# Patient Record
Sex: FEMALE | Race: WHITE | NOT HISPANIC OR LATINO | URBAN - METROPOLITAN AREA
[De-identification: names, ages, dates, MRNs, and addresses within clinical notes are randomized per-mention and may not be internally consistent; named-entity substitution may affect disease eponyms.]

---

## 2022-09-26 RX ORDER — NITROFURANTOIN MACROCRYSTAL 50 MG
1 CAPSULE ORAL
Qty: 0 | Refills: 0 | DISCHARGE
Start: 2022-09-26 | End: 2022-09-30

## 2022-10-05 ENCOUNTER — INPATIENT (INPATIENT)
Facility: HOSPITAL | Age: 42
LOS: 0 days | Discharge: HOME | DRG: 866 | End: 2022-10-06
Attending: HOSPITALIST | Admitting: STUDENT IN AN ORGANIZED HEALTH CARE EDUCATION/TRAINING PROGRAM
Payer: COMMERCIAL

## 2022-10-05 VITALS — WEIGHT: 175.05 LBS

## 2022-10-05 DIAGNOSIS — R50.9 FEVER, UNSPECIFIED: ICD-10-CM

## 2022-10-05 LAB
ADD ON TEST-SPECIMEN IN LAB: SIGNIFICANT CHANGE UP
ADD ON TEST-SPECIMEN IN LAB: SIGNIFICANT CHANGE UP
ALBUMIN SERPL ELPH-MCNC: 3.3 G/DL — SIGNIFICANT CHANGE UP (ref 3.3–5)
ALP SERPL-CCNC: 64 U/L — SIGNIFICANT CHANGE UP (ref 40–120)
ALT FLD-CCNC: 138 U/L — HIGH (ref 12–78)
ANION GAP SERPL CALC-SCNC: 4 MMOL/L — LOW (ref 5–17)
APPEARANCE UR: CLEAR — SIGNIFICANT CHANGE UP
APTT BLD: 29.7 SEC — SIGNIFICANT CHANGE UP (ref 27.5–35.5)
AST SERPL-CCNC: 102 U/L — HIGH (ref 15–37)
BASOPHILS # BLD AUTO: 0.03 K/UL — SIGNIFICANT CHANGE UP (ref 0–0.2)
BASOPHILS NFR BLD AUTO: 0.4 % — SIGNIFICANT CHANGE UP (ref 0–2)
BILIRUB SERPL-MCNC: 0.3 MG/DL — SIGNIFICANT CHANGE UP (ref 0.2–1.2)
BILIRUB UR-MCNC: NEGATIVE — SIGNIFICANT CHANGE UP
BUN SERPL-MCNC: 16 MG/DL — SIGNIFICANT CHANGE UP (ref 7–23)
CALCIUM SERPL-MCNC: 8.8 MG/DL — SIGNIFICANT CHANGE UP (ref 8.5–10.1)
CHLORIDE SERPL-SCNC: 107 MMOL/L — SIGNIFICANT CHANGE UP (ref 96–108)
CO2 SERPL-SCNC: 27 MMOL/L — SIGNIFICANT CHANGE UP (ref 22–31)
COLOR SPEC: YELLOW — SIGNIFICANT CHANGE UP
CREAT SERPL-MCNC: 0.8 MG/DL — SIGNIFICANT CHANGE UP (ref 0.5–1.3)
DIFF PNL FLD: NEGATIVE — SIGNIFICANT CHANGE UP
EGFR: 94 ML/MIN/1.73M2 — SIGNIFICANT CHANGE UP
EOSINOPHIL # BLD AUTO: 0.04 K/UL — SIGNIFICANT CHANGE UP (ref 0–0.5)
EOSINOPHIL NFR BLD AUTO: 0.6 % — SIGNIFICANT CHANGE UP (ref 0–6)
GLUCOSE SERPL-MCNC: 85 MG/DL — SIGNIFICANT CHANGE UP (ref 70–99)
GLUCOSE UR QL: NEGATIVE — SIGNIFICANT CHANGE UP
HCT VFR BLD CALC: 33.3 % — LOW (ref 34.5–45)
HGB BLD-MCNC: 11.1 G/DL — LOW (ref 11.5–15.5)
IMM GRANULOCYTES NFR BLD AUTO: 0.3 % — SIGNIFICANT CHANGE UP (ref 0–0.9)
INR BLD: 1.07 RATIO — SIGNIFICANT CHANGE UP (ref 0.88–1.16)
KETONES UR-MCNC: NEGATIVE — SIGNIFICANT CHANGE UP
LACTATE SERPL-SCNC: 0.9 MMOL/L — SIGNIFICANT CHANGE UP (ref 0.7–2)
LEUKOCYTE ESTERASE UR-ACNC: NEGATIVE — SIGNIFICANT CHANGE UP
LYMPHOCYTES # BLD AUTO: 2.95 K/UL — SIGNIFICANT CHANGE UP (ref 1–3.3)
LYMPHOCYTES # BLD AUTO: 43.5 % — SIGNIFICANT CHANGE UP (ref 13–44)
MCHC RBC-ENTMCNC: 29.8 PG — SIGNIFICANT CHANGE UP (ref 27–34)
MCHC RBC-ENTMCNC: 33.3 GM/DL — SIGNIFICANT CHANGE UP (ref 32–36)
MCV RBC AUTO: 89.3 FL — SIGNIFICANT CHANGE UP (ref 80–100)
MONOCYTES # BLD AUTO: 0.48 K/UL — SIGNIFICANT CHANGE UP (ref 0–0.9)
MONOCYTES NFR BLD AUTO: 7.1 % — SIGNIFICANT CHANGE UP (ref 2–14)
NEUTROPHILS # BLD AUTO: 3.26 K/UL — SIGNIFICANT CHANGE UP (ref 1.8–7.4)
NEUTROPHILS NFR BLD AUTO: 48.1 % — SIGNIFICANT CHANGE UP (ref 43–77)
NITRITE UR-MCNC: NEGATIVE — SIGNIFICANT CHANGE UP
PH UR: 6 — SIGNIFICANT CHANGE UP (ref 5–8)
PLATELET # BLD AUTO: 412 K/UL — HIGH (ref 150–400)
POTASSIUM SERPL-MCNC: 3.9 MMOL/L — SIGNIFICANT CHANGE UP (ref 3.5–5.3)
POTASSIUM SERPL-SCNC: 3.9 MMOL/L — SIGNIFICANT CHANGE UP (ref 3.5–5.3)
PROT SERPL-MCNC: 8.1 GM/DL — SIGNIFICANT CHANGE UP (ref 6–8.3)
PROT UR-MCNC: NEGATIVE — SIGNIFICANT CHANGE UP
PROTHROM AB SERPL-ACNC: 12.4 SEC — SIGNIFICANT CHANGE UP (ref 10.5–13.4)
RAPID RVP RESULT: SIGNIFICANT CHANGE UP
RBC # BLD: 3.73 M/UL — LOW (ref 3.8–5.2)
RBC # FLD: 11.8 % — SIGNIFICANT CHANGE UP (ref 10.3–14.5)
SARS-COV-2 RNA SPEC QL NAA+PROBE: SIGNIFICANT CHANGE UP
SODIUM SERPL-SCNC: 138 MMOL/L — SIGNIFICANT CHANGE UP (ref 135–145)
SP GR SPEC: 1.01 — SIGNIFICANT CHANGE UP (ref 1.01–1.02)
UROBILINOGEN FLD QL: NEGATIVE — SIGNIFICANT CHANGE UP
WBC # BLD: 6.78 K/UL — SIGNIFICANT CHANGE UP (ref 3.8–10.5)
WBC # FLD AUTO: 6.78 K/UL — SIGNIFICANT CHANGE UP (ref 3.8–10.5)

## 2022-10-05 PROCEDURE — 99285 EMERGENCY DEPT VISIT HI MDM: CPT

## 2022-10-05 PROCEDURE — 76705 ECHO EXAM OF ABDOMEN: CPT

## 2022-10-05 PROCEDURE — 93010 ELECTROCARDIOGRAM REPORT: CPT

## 2022-10-05 PROCEDURE — 84702 CHORIONIC GONADOTROPIN TEST: CPT

## 2022-10-05 PROCEDURE — 74177 CT ABD & PELVIS W/CONTRAST: CPT | Mod: 26

## 2022-10-05 PROCEDURE — 70450 CT HEAD/BRAIN W/O DYE: CPT | Mod: 26,MA

## 2022-10-05 PROCEDURE — 74177 CT ABD & PELVIS W/CONTRAST: CPT | Mod: MA

## 2022-10-05 PROCEDURE — 85025 COMPLETE CBC W/AUTO DIFF WBC: CPT

## 2022-10-05 PROCEDURE — 71045 X-RAY EXAM CHEST 1 VIEW: CPT | Mod: 26

## 2022-10-05 PROCEDURE — 80053 COMPREHEN METABOLIC PANEL: CPT

## 2022-10-05 PROCEDURE — 36415 COLL VENOUS BLD VENIPUNCTURE: CPT

## 2022-10-05 RX ORDER — IBUPROFEN 200 MG
2 TABLET ORAL
Qty: 0 | Refills: 0 | DISCHARGE

## 2022-10-05 RX ORDER — SODIUM CHLORIDE 9 MG/ML
2500 INJECTION INTRAMUSCULAR; INTRAVENOUS; SUBCUTANEOUS ONCE
Refills: 0 | Status: COMPLETED | OUTPATIENT
Start: 2022-10-05 | End: 2022-10-05

## 2022-10-05 RX ORDER — PREGABALIN 225 MG/1
1 CAPSULE ORAL
Qty: 0 | Refills: 0 | DISCHARGE

## 2022-10-05 RX ORDER — CHOLECALCIFEROL (VITAMIN D3) 125 MCG
1 CAPSULE ORAL
Qty: 0 | Refills: 0 | DISCHARGE

## 2022-10-05 RX ORDER — IBUPROFEN 200 MG
200 TABLET ORAL ONCE
Refills: 0 | Status: COMPLETED | OUTPATIENT
Start: 2022-10-05 | End: 2022-10-05

## 2022-10-05 RX ORDER — CEFTRIAXONE 500 MG/1
2000 INJECTION, POWDER, FOR SOLUTION INTRAMUSCULAR; INTRAVENOUS ONCE
Refills: 0 | Status: COMPLETED | OUTPATIENT
Start: 2022-10-05 | End: 2022-10-05

## 2022-10-05 RX ADMIN — CEFTRIAXONE 100 MILLIGRAM(S): 500 INJECTION, POWDER, FOR SOLUTION INTRAMUSCULAR; INTRAVENOUS at 17:42

## 2022-10-05 RX ADMIN — SODIUM CHLORIDE 2500 MILLILITER(S): 9 INJECTION INTRAMUSCULAR; INTRAVENOUS; SUBCUTANEOUS at 15:43

## 2022-10-05 RX ADMIN — Medication 200 MILLIGRAM(S): at 22:43

## 2022-10-05 RX ADMIN — SODIUM CHLORIDE 2500 MILLILITER(S): 9 INJECTION INTRAMUSCULAR; INTRAVENOUS; SUBCUTANEOUS at 16:43

## 2022-10-05 NOTE — ED ADULT NURSE NOTE - OBJECTIVE STATEMENT
41 y/o female awake alert and oriented x4 presents to fever since 9/17. Pt had intermittent fever since 9/17, followed up with PCP, tested negative for flu and covid. Pt has been taking advil for fever tmax 103 and headache with relief. Pt has been following with Dr. Ghosh (infectious disease) and was told to come in to r/o meningitis. Denies neck stiffness, vomiting, nausea. Pt was at [place] in Aug to drop off her daughter in college. Denies chest pain, sob, abd pain. hx [facility] barr.

## 2022-10-05 NOTE — PHARMACOTHERAPY INTERVENTION NOTE - COMMENTS
Medication reconciliation completed.  Reviewed Medication list and confirmed med allergies with patient; confirmed with Dr. First Medlily.

## 2022-10-05 NOTE — ED PROVIDER NOTE - OBJECTIVE STATEMENT
43 yo female presents to the ED sent in by Dr. Rona Ghosh ([phone]) for spinal tap and r/o septic meningitis. Pt has endorsed fever from 103.3, headache, body aches and chills x3 weeks. Pt was on Macrobid 9/26 for UTI for 5 days.  Pt was seen by her PCP and sent to Dr. Ghosh and had an infectious disease workup which has been negative. Pt had a complaint of a severe headache yesterday to Dr. Ghosh. Pt woke up last night with a headache and was unable to move in the morning with a fever of 100.

## 2022-10-05 NOTE — ED ADULT NURSE REASSESSMENT NOTE - NS ED NURSE REASSESS COMMENT FT1
Tylenol given as per md order. Vitals stable. Updated with plan of care. Will cont to monitor for safety and comfort.

## 2022-10-05 NOTE — ED PROVIDER NOTE - CLINICAL SUMMARY MEDICAL DECISION MAKING FREE TEXT BOX
Pt sent by Dr. Ghosh infectious disease with no obvious source of fever but pt is afebrile in the ED. Discussed with hospitalist, will admit for FUO for lumbar puncture in the morning. Pt does not have a stiff neck

## 2022-10-05 NOTE — ED PROVIDER NOTE - MUSCULOSKELETAL, MLM
Spine appears normal, range of motion is not limited, no muscle or joint tenderness, extremities no edema

## 2022-10-05 NOTE — ED ADULT TRIAGE NOTE - CHIEF COMPLAINT QUOTE
Pt sent in by Dr. Ghosh (infectious disease) for spinal tap and r/o meningitis. Pt has endorsed fever and headache x2 weeks. Was on Marcobid 9/19 for UTI. Denies urinary symptoms.

## 2022-10-06 VITALS
SYSTOLIC BLOOD PRESSURE: 110 MMHG | DIASTOLIC BLOOD PRESSURE: 74 MMHG | OXYGEN SATURATION: 100 % | TEMPERATURE: 99 F | RESPIRATION RATE: 16 BRPM | HEART RATE: 84 BPM

## 2022-10-06 LAB
ADD ON TEST-SPECIMEN IN LAB: SIGNIFICANT CHANGE UP
ALBUMIN SERPL ELPH-MCNC: 3.1 G/DL — LOW (ref 3.3–5)
ALP SERPL-CCNC: 62 U/L — SIGNIFICANT CHANGE UP (ref 40–120)
ALT FLD-CCNC: 123 U/L — HIGH (ref 12–78)
ANION GAP SERPL CALC-SCNC: 3 MMOL/L — LOW (ref 5–17)
AST SERPL-CCNC: 77 U/L — HIGH (ref 15–37)
BASOPHILS # BLD AUTO: 0.01 K/UL — SIGNIFICANT CHANGE UP (ref 0–0.2)
BASOPHILS NFR BLD AUTO: 0.2 % — SIGNIFICANT CHANGE UP (ref 0–2)
BILIRUB SERPL-MCNC: 0.4 MG/DL — SIGNIFICANT CHANGE UP (ref 0.2–1.2)
BUN SERPL-MCNC: 8 MG/DL — SIGNIFICANT CHANGE UP (ref 7–23)
CALCIUM SERPL-MCNC: 8.7 MG/DL — SIGNIFICANT CHANGE UP (ref 8.5–10.1)
CHLORIDE SERPL-SCNC: 107 MMOL/L — SIGNIFICANT CHANGE UP (ref 96–108)
CO2 SERPL-SCNC: 30 MMOL/L — SIGNIFICANT CHANGE UP (ref 22–31)
CREAT SERPL-MCNC: 0.69 MG/DL — SIGNIFICANT CHANGE UP (ref 0.5–1.3)
CULTURE RESULTS: SIGNIFICANT CHANGE UP
EGFR: 110 ML/MIN/1.73M2 — SIGNIFICANT CHANGE UP
EOSINOPHIL # BLD AUTO: 0.02 K/UL — SIGNIFICANT CHANGE UP (ref 0–0.5)
EOSINOPHIL NFR BLD AUTO: 0.3 % — SIGNIFICANT CHANGE UP (ref 0–6)
GLUCOSE SERPL-MCNC: 80 MG/DL — SIGNIFICANT CHANGE UP (ref 70–99)
HCT VFR BLD CALC: 33.6 % — LOW (ref 34.5–45)
HGB BLD-MCNC: 11.3 G/DL — LOW (ref 11.5–15.5)
IMM GRANULOCYTES NFR BLD AUTO: 0.3 % — SIGNIFICANT CHANGE UP (ref 0–0.9)
LYMPHOCYTES # BLD AUTO: 1.95 K/UL — SIGNIFICANT CHANGE UP (ref 1–3.3)
LYMPHOCYTES # BLD AUTO: 29.9 % — SIGNIFICANT CHANGE UP (ref 13–44)
MCHC RBC-ENTMCNC: 30.1 PG — SIGNIFICANT CHANGE UP (ref 27–34)
MCHC RBC-ENTMCNC: 33.6 GM/DL — SIGNIFICANT CHANGE UP (ref 32–36)
MCV RBC AUTO: 89.4 FL — SIGNIFICANT CHANGE UP (ref 80–100)
MONOCYTES # BLD AUTO: 0.55 K/UL — SIGNIFICANT CHANGE UP (ref 0–0.9)
MONOCYTES NFR BLD AUTO: 8.4 % — SIGNIFICANT CHANGE UP (ref 2–14)
NEUTROPHILS # BLD AUTO: 3.97 K/UL — SIGNIFICANT CHANGE UP (ref 1.8–7.4)
NEUTROPHILS NFR BLD AUTO: 60.9 % — SIGNIFICANT CHANGE UP (ref 43–77)
PLATELET # BLD AUTO: 373 K/UL — SIGNIFICANT CHANGE UP (ref 150–400)
POTASSIUM SERPL-MCNC: 4.1 MMOL/L — SIGNIFICANT CHANGE UP (ref 3.5–5.3)
POTASSIUM SERPL-SCNC: 4.1 MMOL/L — SIGNIFICANT CHANGE UP (ref 3.5–5.3)
PROT SERPL-MCNC: 7.7 GM/DL — SIGNIFICANT CHANGE UP (ref 6–8.3)
RBC # BLD: 3.76 M/UL — LOW (ref 3.8–5.2)
RBC # FLD: 11.9 % — SIGNIFICANT CHANGE UP (ref 10.3–14.5)
SODIUM SERPL-SCNC: 140 MMOL/L — SIGNIFICANT CHANGE UP (ref 135–145)
SPECIMEN SOURCE: SIGNIFICANT CHANGE UP
WBC # BLD: 6.52 K/UL — SIGNIFICANT CHANGE UP (ref 3.8–10.5)
WBC # FLD AUTO: 6.52 K/UL — SIGNIFICANT CHANGE UP (ref 3.8–10.5)

## 2022-10-06 PROCEDURE — 99222 1ST HOSP IP/OBS MODERATE 55: CPT

## 2022-10-06 PROCEDURE — 76705 ECHO EXAM OF ABDOMEN: CPT | Mod: 26

## 2022-10-06 PROCEDURE — 12345: CPT | Mod: NC

## 2022-10-06 RX ORDER — CEFTRIAXONE 500 MG/1
2000 INJECTION, POWDER, FOR SOLUTION INTRAMUSCULAR; INTRAVENOUS EVERY 24 HOURS
Refills: 0 | Status: DISCONTINUED | OUTPATIENT
Start: 2022-10-06 | End: 2022-10-06

## 2022-10-06 RX ORDER — ZINC SULFATE TAB 220 MG (50 MG ZINC EQUIVALENT) 220 (50 ZN) MG
1 TAB ORAL
Qty: 0 | Refills: 0 | DISCHARGE

## 2022-10-06 RX ORDER — VANCOMYCIN HCL 1 G
1250 VIAL (EA) INTRAVENOUS EVERY 12 HOURS
Refills: 0 | Status: DISCONTINUED | OUTPATIENT
Start: 2022-10-06 | End: 2022-10-06

## 2022-10-06 RX ORDER — LANOLIN ALCOHOL/MO/W.PET/CERES
1 CREAM (GRAM) TOPICAL
Qty: 0 | Refills: 0 | DISCHARGE

## 2022-10-06 RX ORDER — LANOLIN ALCOHOL/MO/W.PET/CERES
3 CREAM (GRAM) TOPICAL AT BEDTIME
Refills: 0 | Status: DISCONTINUED | OUTPATIENT
Start: 2022-10-06 | End: 2022-10-06

## 2022-10-06 RX ORDER — VANCOMYCIN HCL 1 G
VIAL (EA) INTRAVENOUS
Refills: 0 | Status: DISCONTINUED | OUTPATIENT
Start: 2022-10-06 | End: 2022-10-06

## 2022-10-06 RX ORDER — VANCOMYCIN HCL 1 G
1250 VIAL (EA) INTRAVENOUS ONCE
Refills: 0 | Status: COMPLETED | OUTPATIENT
Start: 2022-10-06 | End: 2022-10-06

## 2022-10-06 RX ORDER — ONDANSETRON 8 MG/1
4 TABLET, FILM COATED ORAL EVERY 8 HOURS
Refills: 0 | Status: DISCONTINUED | OUTPATIENT
Start: 2022-10-06 | End: 2022-10-06

## 2022-10-06 RX ORDER — ACETAMINOPHEN 500 MG
650 TABLET ORAL EVERY 6 HOURS
Refills: 0 | Status: DISCONTINUED | OUTPATIENT
Start: 2022-10-06 | End: 2022-10-06

## 2022-10-06 RX ADMIN — Medication 166.67 MILLIGRAM(S): at 01:02

## 2022-10-06 RX ADMIN — Medication 650 MILLIGRAM(S): at 08:07

## 2022-10-06 NOTE — ED ADULT NURSE REASSESSMENT NOTE - NS ED NURSE REASSESS COMMENT FT1
Pt resting in stretcher w/ call bell within reach. Pt aware of POC, awaiting lumbar puncture. Pt breathing even and unlabored. Pt skin warm, dry and intact.

## 2022-10-06 NOTE — PROGRESS NOTE ADULT - SUBJECTIVE AND OBJECTIVE BOX
Pt has been seen and examined with FP resident, resident supervised agree with a/p       Patient is a 42y old  Female who presents with a chief complaint of Fevers, Chills, Headache (06 Oct 2022 00:05)      HPI:  41 y/o F who was referred to  by Dr. Rona Ghosh for further evaluation and management c/o subjective fevers (TMax 103.3'F), chills, body aches and ongoing headaches.     PHYSICAL EXAM:  Vital Signs Last 24 Hrs  T(C): 37.6 (06 Oct 2022 15:45), Max: 37.6 (06 Oct 2022 15:45)  T(F): 99.7 (06 Oct 2022 15:45), Max: 99.7 (06 Oct 2022 15:45)  HR: 81 (06 Oct 2022 15:45) (62 - 85)  BP: 113/78 (06 Oct 2022 15:45) (105/61 - 123/83)  BP(mean): 90 (06 Oct 2022 15:45) (75 - 90)  RR: 18 (06 Oct 2022 15:45) (17 - 18)  SpO2: 100% (06 Oct 2022 15:45) (98% - 100%)    Parameters below as of 06 Oct 2022 15:45  Patient On (Oxygen Delivery Method): room air      -rs-aeeb, cta  -cvs-s1s2 normal   -p/a-soft,bs+      A/P    #Active monon  - d/c today with further management as an outpt, time spent 45 minutes

## 2022-10-06 NOTE — DISCHARGE NOTE PROVIDER - NSDCCPCAREPLAN_GEN_ALL_CORE_FT
PRINCIPAL DISCHARGE DIAGNOSIS  Diagnosis: Infectious mononucleosis  Assessment and Plan of Treatment: You can here with fever, body aches, and other flu-like sxs. You had a normal white blood cell count and lactate. Your hemoglobin was slightly low, with mildly elevated liver function tests, but your urine was negative, and so was your CXR. CT abdomen only showed lymphadenopathy and Abdominal US showed only fatty liver. You were given some fluids and antibiotics, which were stopped when you tested postive for mono. Infectious disease saw you and gave you further recommendations. Today you are doing better and will be discharged for outpatient follow-up with your PCP  - Please get plenty of rest, stay hydrated and eat a healthy diet  - Please avoid vigorous exercise/contact sports and stay at home as you are still contagious  - You may feel tired with some residual sxs for at least another month. Thank you!

## 2022-10-06 NOTE — H&P ADULT - ASSESSMENT
43 y/o F who was referred to  by Dr. Rona Ghosh for further evaluation and management c/o subjective fevers (TMax 103.3'F), chills, body aches and ongoing headaches. Of note the patient's symptoms began approximately 3 weeks (9/17) prior to presentation. The patient states that she was diagnosed on 9/26 with a UTI and treated with Macrobid for 5 days. The patient was referred by her PCP to Dr. Ghosh (Infectious Disease) where an infectious disease workup has been negative since.   Labs => Hgb/Hct 11.1/33.3, D-dimer 411, AST//138, UA (-). CT Head => No acute intracranial bleeding. CT ABD/Pelvis => Nonspecific mild retroperitoneal lymphadenopathy. No other acute findings in the abdomen or pelvis. CXR => Negative chest. In the ED the patient received Ceftriaxone 2g IVPB x 1, Ibuprofen 200mg PO x 1, and NS x 2.5L.    #Fever of Unknown Origin  #Intractable Headaches  ~admit to Medicine  ~f/u PAN C+S  ~f/u w/ ID consultation in the am  ~f/u w/ IR consultation (plan is for possible LP in the am)  ~patient given Ceftriaxone 2g IVPB x 1, and Vancomycin 1.25g IVPB x 1  ~cont. pain management  ~cont. droplet isolation  ~cont. pain management prn  ~f/u ESR/CRP  ~f/u w/ Neurology in the am    #Vte ppx  ~IMPROVE VTE Risk Score is 0  [  ] Previous VTE                                                  3  [  ] Thrombophilia                                               2  [  ] Lower limb paralysis                                      2        (unable to hold up >15 seconds)    [  ] Current Cancer                                              2         (within 6 months)  [  ] Immobilization > 24 hrs                                1  [  ] ICU/CCU stay > 24 hours                              1  [  ] Age > 60                                                      1  ~encourage ambulation       43 y/o F who was referred to  by Dr. Rona Ghosh for further evaluation and management c/o subjective fevers (TMax 103.3'F), chills, body aches and ongoing headaches. Of note the patient's symptoms began approximately 3 weeks (9/17) prior to presentation. The patient states that she was diagnosed on 9/26 with a UTI and treated with Macrobid for 5 days. The patient was referred by her PCP to Dr. Ghosh (Infectious Disease) where an infectious disease workup has been negative since.   Labs => Hgb/Hct 11.1/33.3, D-dimer 411, AST//138, UA (-). CT Head => No acute intracranial bleeding. CT ABD/Pelvis => Nonspecific mild retroperitoneal lymphadenopathy. No other acute findings in the abdomen or pelvis. CXR => Negative chest. In the ED the patient received Ceftriaxone 2g IVPB x 1, Ibuprofen 200mg PO x 1, and NS x 2.5L.    #Fever of Unknown Origin  #Intractable Headaches  ~admit to Medicine  ~f/u PAN C+S  ~f/u w/ ID consultation in the am  ~f/u w/ IR consultation (plan is for possible LP in the am)  ~patient given Ceftriaxone 2g IVPB x 1, and Vancomycin 1.25g IVPB x 1  ~cont. pain management  ~cont. droplet isolation  ~cont. pain management prn  ~f/u ESR/CRP  ~f/u w/ Neurology in the am    #Elevated Liver Enzymes  ~CT Abdomen/Pelvis reviewed  ~f/u abdominal US in the am  ~trend labs     #Vte ppx  ~IMPROVE VTE Risk Score is 0  [  ] Previous VTE                                                  3  [  ] Thrombophilia                                               2  [  ] Lower limb paralysis                                      2        (unable to hold up >15 seconds)    [  ] Current Cancer                                              2         (within 6 months)  [  ] Immobilization > 24 hrs                                1  [  ] ICU/CCU stay > 24 hours                              1  [  ] Age > 60                                                      1  ~encourage ambulation

## 2022-10-06 NOTE — DISCHARGE NOTE PROVIDER - NSDCCAREPROVSEEN_GEN_ALL_CORE_FT
Epifanio White, Savage HATCH  Patkahtie, Terrell Hines, Jossie Bridges, Wilber Ghosh, Rona Jung, Eagle Rodriguez, Patrick Caraballo, Hermann Cordon, Juvencio

## 2022-10-06 NOTE — DISCHARGE NOTE PROVIDER - NSDCMRMEDTOKEN_GEN_ALL_CORE_FT
cyanocobalamin: 1  injectable once  ***pt states she was not feeling well and received a b12 shot ~2 weeks ago***  Macrobid 100 mg oral capsule: 1 cap(s) orally 2 times a day for 5 days  ***course complete***  Motrin  mg oral tablet: 2 tab(s) orally every 6 hours, As Needed - for for headache  Vitamin D3 25 mcg (1000 intl units) oral tablet: 1 tab(s) orally once a day

## 2022-10-06 NOTE — DISCHARGE NOTE PROVIDER - CARE PROVIDER_API CALL
Rona Ghosh)  Internal Medicine  [address] [place] [address], [facility] [address]  [place] [address]  Phone: [phone]  Fax: [phone]  Established Patient  Follow Up Time:

## 2022-10-06 NOTE — DISCHARGE NOTE NURSING/CASE MANAGEMENT/SOCIAL WORK - PATIENT PORTAL LINK FT
You can access the FollowMyHealth Patient Portal offered by [facility] by registering at the following website: [email or web address]. By joining [facility]’s FollowMyHealth portal, you will also be able to view your health information using other applications (apps) compatible with our system.

## 2022-10-06 NOTE — ED ADULT NURSE REASSESSMENT NOTE - NS ED NURSE REASSESS COMMENT FT1
Pt care assumed from previous RN. Pt reports improved symptoms. Pt reports minor headache 3/10, acetaminophen  650mg given. Pt resting in stretcher w/ call bell within reach. Dietary menu given.

## 2022-10-06 NOTE — CONSULT NOTE ADULT - ASSESSMENT
41 y/o Female with past medical history of "mono" as a teenager, admitted on 10/5 for evaluation of fevers that began on 9/17, then became associated with headaches; was treated on 9/26 with macrobid for UTI. Still had intermittent fever and fatigue and her PCP in [place] referred her to Infectious Disease doctor at [facility]. She has had a number of lab tests done; notes travel to [place] in August to bring her daughter to college and to [place] for her sick brother; she denies recent dental work, bug or tick bites. 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 No one at home is sick. No photophobia, no stiff neck and when she had temp to 99.8 her Infectious Disease doctor told her to come to ED for lumbar puncture. She did have many outpatient labs, done at [facility] for which I was able to review, upon review, it appears that the EBV labs are all positive consistent with acute EBV infection.     1. Patient admitted with fever, extensive workup, however the labwork is significant for acute EBV infection  - have stopped all antibiotics  - discontinue lumbar puncture order  - stop isolation  - patient stable for discharge on no antibiotics  - no contact sports, may have fatigue and malaise for up to six weeks  - no further iv locks or fluids  - discussed with rn at bedside and patient hospitalist to discharge patient home with PCP follow up

## 2022-10-06 NOTE — DISCHARGE NOTE PROVIDER - HOSPITAL COURSE
41 y/o w/no significant PMH presenting for fevers. Pt reports 3 week hx of fever, no alleviating/aggravating sxs, associated with ha, shaking chills, myalgias, with possible changes in urination as well. She was originally saw outpatient doctor who started her on macrobid for UTI, but when sxs did not improve, pt presented to . In the ED, pt was found to be afebrile, hemodynamically stable but reporting pain. Labs were notable for WBC and lactate WNL but Hgb/Hct was at 11.1/33.3, D-dimer was at 411, AST/ALT was at 102/138. CXR was clear and UA was WNL. CT head had no acute changes,  and CT a/p showed only nonspecific mild retroperitoneal lymphadenopathy. Pt was given fluids, abx, and tylenol and stayed for further evaluation. Abdominal US showed only hepatic steatosis. Infectious disease saw pt, and noted that labwork came back + for EBV. Today pt feels somewhat better with VSS. She will be discharged for further f/u with her PCP.    Subjective: No significant events since the ED. Pt still endorses slight headache but is eager to leave and is amenable to plan for discharge. Pt denies syncope, cp, sob, palpitations, changes in urination. ROS as stated above.    Vital Signs Last 24 Hrs  T(C): 37.6 (06 Oct 2022 15:45), Max: 37.6 (06 Oct 2022 15:45)  T(F): 99.7 (06 Oct 2022 15:45), Max: 99.7 (06 Oct 2022 15:45)  HR: 81 (06 Oct 2022 15:45) (62 - 85)  BP: 113/78 (06 Oct 2022 15:45) (105/61 - 123/83)  BP(mean): 90 (06 Oct 2022 15:45) (75 - 90)  RR: 18 (06 Oct 2022 15:45) (17 - 18)  SpO2: 100% (06 Oct 2022 15:45) (98% - 100%)    Parameters below as of 06 Oct 2022 15:45  Patient On (Oxygen Delivery Method): room air    Vital Signs Last 24 Hrs  T(C): 37.6 (06 Oct 2022 15:45), Max: 37.6 (06 Oct 2022 15:45)  T(F): 99.7 (06 Oct 2022 15:45), Max: 99.7 (06 Oct 2022 15:45)  HR: 81 (06 Oct 2022 15:45) (62 - 85)  BP: 113/78 (06 Oct 2022 15:45) (105/61 - 123/83)  BP(mean): 90 (06 Oct 2022 15:45) (75 - 90)  RR: 18 (06 Oct 2022 15:45) (17 - 18)  SpO2: 100% (06 Oct 2022 15:45) (98% - 100%)    Parameters below as of 06 Oct 2022 15:45  Patient On (Oxygen Delivery Method): room air    PHYSICAL EXAM:  GENERAL: NAD, lying in bed comfortably  HEAD:  Atraumatic, Normocephalic  EYES: EOMI, PERRLA, conjunctiva and sclera clear  ENT: Moist mucous membranes  NECK: Supple, No JVD  CHEST/LUNG: Clear to auscultation bilaterally; No respiratory  HEART: Regular rate and rhythm; No murmurs, rubs, or gallops  ABDOMEN: Bowel sounds present; Soft, Nontender, Nondistended. No hepatomegally  EXTREMITIES:  No clubbing, cyanosis, or edema  NERVOUS SYSTEM:  Alert & Oriented X3, speech clear. No new deficits   MSK: Normal muscle tone, no atrophy, no rigidity, no contractions  SKIN: No new rashes or lesions    EKG/RADIOLOGY  < from: US Abdomen Upper Quadrant Right (10.06.22 @ 04:46) >  IMPRESSION:  Hepatic steatosis.  No cholelithiasis or biliary ductal dilatation.    < from: CT Abdomen and Pelvis w/ IV Cont (10.05.22 @ 17:58) >  IMPRESSION:  Nonspecific mild retroperitoneal lymphadenopathy.    < from: Xray Chest 1 View- PORTABLE-Urgent (10.05.22 @ 16:48) >  IMPRESSION: Negative chest.    < from: CT Head No Cont (10.05.22 @ 16:20) >    FINDINGS:  There is no acute intracranial mass-effect, hemorrhage, midline shift, or   abnormal extra-axial fluid collection. Gray-white differentiation is   maintained.  Ventricles, sulci, and cisterns are normal in size for the patient's age   without hydrocephalus. Basal cisterns are patent.  Visualized paranasal sinuses and mastoid air cells areclear. Calvarium   is intact.    IMPRESSION:  No acute intracranial bleeding.

## 2022-10-06 NOTE — CONSULT NOTE ADULT - SUBJECTIVE AND OBJECTIVE BOX
Patient is a 42y old  Female who presents with a chief complaint of Fevers, Chills, Headache (06 Oct 2022 00:05)    HPI:  41 y/o Female with past medical history of "mono" as a teenager, admitted on 10/5 for evaluation of fevers that began on , then became associated with headaches; was treated on  with macrobid for UTI. Still had intermittent fever and fatigue and her PCP in [place] referred her to Infectious Disease doctor at [facility]. She has had a number of lab tests done; notes travel to [place] in August to bring her daughter to college and to [place] for her sick brother; she denies recent dental work, bug or tick bites. 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 No one at home is sick. No photophobia, no stiff neck and when she had temp to 99.8 her Infectious Disease doctor told her to come to ED for lumbar puncture. She did have many outpatient labs, done at [facility] for which I was able to review, upon review, it appears that the EBV labs are all positive consistent with acute EBV infection.       PMH: as above  PSH: as above  Meds: per reconciliation sheet, noted below  MEDICATIONS  (STANDING):    MEDICATIONS  (PRN):  acetaminophen     Tablet .. 650 milliGRAM(s) Oral every 6 hours PRN Temp greater or equal to 38C (100.4F), Mild Pain (1 - 3)  aluminum hydroxide/magnesium hydroxide/simethicone Suspension 30 milliLiter(s) Oral every 4 hours PRN Dyspepsia  melatonin 3 milliGRAM(s) Oral at bedtime PRN Insomnia  ondansetron Injectable 4 milliGRAM(s) IV Push every 8 hours PRN Nausea and/or Vomiting    Allergies    No Known Allergies    Intolerances      Social: no smoking, no alcohol, no illegal drugs; no recent travel, no exposure to TB  FAMILY HISTORY:  No pertinent family history in first degree relatives       no history of premature cardiovascular disease in first degree relatives  ROS: the patient denies fever, no chills,  no dizziness, no sore throat, no blurry vision, no CP, no palpitations, no SOB, no cough, no abdominal pain, no diarrhea, no N/V, no dysuria, no leg pain, no claudication, no rash, no joint aches, no rectal pain or bleeding, no night sweats  All other systems reviewed and are negative    Vital Signs Last 24 Hrs  T(C): 37.6 (06 Oct 2022 15:45), Max: 37.6 (06 Oct 2022 15:45)  T(F): 99.7 (06 Oct 2022 15:45), Max: 99.7 (06 Oct 2022 15:45)  HR: 81 (06 Oct 2022 15:45) (62 - 85)  BP: 113/78 (06 Oct 2022 15:45) (105/61 - 123/83)  BP(mean): 90 (06 Oct 2022 15:45) (75 - 90)  RR: 18 (06 Oct 2022 15:45) (17 - 18)  SpO2: 100% (06 Oct 2022 15:45) (98% - 100%)    Parameters below as of 06 Oct 2022 15:45  Patient On (Oxygen Delivery Method): room air      Daily     Daily     PE:    Constitutional: frail looking  HEENT: NC/AT, EOMI, PERRLA, conjunctivae clear; ears and nose atraumatic; pharynx clear  Neck: supple; thyroid not palpable  Back: no tenderness  Respiratory: respiratory effort normal; clear to auscultation  Cardiovascular: S1S2 regular, no murmurs  Abdomen: soft, not tender, not distended, positive BS; no liver or spleen organomegaly  Genitourinary: no suprapubic tenderness  Musculoskeletal: no muscle tenderness, no joint swelling or tenderness  Neurological/ Psychiatric: AxOx3, judgement and insight normal;  moving all extremities  Skin: no rashes; no palpable lesions    Labs: all available labs reviewed                        11.3   6.52  )-----------( 373      ( 06 Oct 2022 11:28 )             33.6     10-    140  |  107  |  8   ----------------------------<  80  4.1   |  30  |  0.69    Ca    8.7      06 Oct 2022 11:28    TPro  7.7  /  Alb  3.1<L>  /  TBili  0.4  /  DBili  x   /  AST  77<H>  /  ALT  123<H>  /  AlkPhos  62  10-06     LIVER FUNCTIONS - ( 06 Oct 2022 11:28 )  Alb: 3.1 g/dL / Pro: 7.7 gm/dL / ALK PHOS: 62 U/L / ALT: 123 U/L / AST: 77 U/L / GGT: x           Urinalysis Basic - ( 05 Oct 2022 15:37 )    Color: Yellow / Appearance: Clear / S.010 / pH: x  Gluc: x / Ketone: Negative  / Bili: Negative / Urobili: Negative   Blood: x / Protein: Negative / Nitrite: Negative   Leuk Esterase: Negative / RBC: x / WBC x   Sq Epi: x / Non Sq Epi: x / Bacteria: x      [facility] outpatient labs all positive for acute EBV infection including IgM        < from: CT Abdomen and Pelvis w/ IV Cont (10.05.22 @ 17:58) >    ACC: [id number] EXAM:  CT ABDOMEN AND PELVIS IC                          PROCEDURE DATE:  10/05/2022          INTERPRETATION:  CLINICAL INFORMATION: Fever of unknown origin. High LFTs.    COMPARISON: None.    CONTRAST/COMPLICATIONS:  IV Contrast: Omnipaque 350  90 cc administered   10 cc discarded  Oral Contrast: NONE  Complications: None reported at time of study completion    PROCEDURE:  CT of the Abdomen and Pelvis was performed.  Sagittal and coronal reformats were performed.    FINDINGS:  LOWER CHEST: Within normal limits.    LIVER: Within normal limits.  BILE DUCTS: Normal caliber.  GALLBLADDER: Within normal limits.  SPLEEN: Within normal limits.  PANCREAS: Within normal limits.  ADRENALS: Within normal limits.  KIDNEYS/URETERS: Withinnormal limits.    BLADDER: Within normal limits.  REPRODUCTIVE ORGANS: Uterus and adnexa within normal limits. Corpus   luteum noted in the left ovary.    BOWEL: No bowel obstruction. Appendix is normal.  PERITONEUM: Small amount of pelvic free fluid, likely physiologic.  VESSELS: Within normal limits.  RETROPERITONEUM/LYMPH NODES: Mild para-aortic retroperitoneal   lymphadenopathy, within a reference left periaortic node measuring 11 mm   short axis (series 2, image 42)..  ABDOMINAL WALL: Withinnormal limits.  BONES: Within normal limits.    IMPRESSION:  Nonspecific mild retroperitoneal lymphadenopathy.    No other acute findings in the abdomen or pelvis.    < end of copied text >    < from: US Abdomen Upper Quadrant Right (10.06.22 @ 04:46) >  IMPRESSION:  Hepatic steatosis.  No cholelithiasis or biliary ductal dilatation.    < end of copied text >      Radiology: all available radiological tests reviewed    Advanced directives addressed: full resuscitation

## 2022-10-10 LAB
CULTURE RESULTS: SIGNIFICANT CHANGE UP
CULTURE RESULTS: SIGNIFICANT CHANGE UP
R RICKETTSI AB SER-ACNC: NEGATIVE — SIGNIFICANT CHANGE UP
R RICKETTSI IGM SER-ACNC: 0.53 INDEX — SIGNIFICANT CHANGE UP (ref 0–0.89)
RICK SF IGG TITR SER IF: NEGATIVE — SIGNIFICANT CHANGE UP
RICK SF IGM TITR SER IF: 0.53 INDEX — SIGNIFICANT CHANGE UP (ref 0–0.89)
SPECIMEN SOURCE: SIGNIFICANT CHANGE UP
SPECIMEN SOURCE: SIGNIFICANT CHANGE UP

## 2022-10-11 DIAGNOSIS — B27.00 GAMMAHERPESVIRAL MONONUCLEOSIS WITHOUT COMPLICATION: ICD-10-CM

## 2022-10-11 DIAGNOSIS — Z86.19 PERSONAL HISTORY OF OTHER INFECTIOUS AND PARASITIC DISEASES: ICD-10-CM

## 2022-10-11 DIAGNOSIS — K76.0 FATTY (CHANGE OF) LIVER, NOT ELSEWHERE CLASSIFIED: ICD-10-CM

## 2022-10-11 DIAGNOSIS — Z53.09 PROCEDURE AND TREATMENT NOT CARRIED OUT BECAUSE OF OTHER CONTRAINDICATION: ICD-10-CM

## 2022-10-11 DIAGNOSIS — R74.8 ABNORMAL LEVELS OF OTHER SERUM ENZYMES: ICD-10-CM

## 2024-11-22 NOTE — H&P ADULT - NSHPPHYSICALEXAM_GEN_ALL_CORE
Thank you for coming in to see us at [facility] It was nice to meet you, and I hope you feel better soon. Please feel free to return to the ER at any time should your symptoms get worse, or if you have different emergent concerns.    Our goal in the emergency department is to always give you outstanding care and exceptional service. You may receive a survey by mail or e-mail in the next week regarding your experience in our ED. We would greatly appreciate your completing and returning the survey. Your feedback provides us with a way to recognize our staff who give very good care and it helps us learn how to improve when your experience was below our aspiration of excellence.      It is important to remember that some problems or medical conditions are difficult to diagnose and may not be found or addressed during your Emergency Department visit.  These conditions often start with non-specific symptoms and can only be diagnosed on follow up visits with your primary care physician or specialist when the symptoms continue or change. Please remember that all medical conditions can change, and we cannot predict how you will be feeling tomorrow or the next day.    Return to the ER with any questions/concerns, new/concerning symptoms, worsening, failure to improve or inability to obtain follow-up.       Be sure to follow up with your primary care doctor and review all labs/imaging/tests that were performed during your ER visit with them. It is very common for us to identify non-emergent incidental findings which must be followed up with your primary care physician.  Some labs/imaging/tests may be outside of the normal range, and require non-emergent follow-up and/or further investigation/treatment/procedures/testing to help diagnose/exclude/prevent complications or other potentially serious medical conditions. Some abnormalities may not have been discussed or addressed during your ER visit. Some lab  results may not return during your ER visit but can be accessible by downloading the free [facility] or by visiting [email or web address] . It is important for you to review all labs/imaging/tests which are outside of the normal range with your physician.    An ER visit does not replace a primary care visit, and many screening tests or follow-up tests cannot be ordered by an ER doctor or performed by the ER. Some tests may even require pre-approval.    If you do not have a primary care doctor, you may contact the one listed on your discharge paperwork or you may also call the [facility] Appointment Desk at [phone] , or [employer] at  [phone] to schedule an appointment, or establish care with a primary care doctor or even a specialist and to obtain information about local resources. It is important to your health that you have a primary care doctor.    Please take all medications as directed. We have done our best to select a medication for you that will treat your condition however, all medications may potentially have side-effects and it is impossible to predict which medications may give you side-effects or what those side-effects (if any) those medications may give you.  If you feel that you are having a negative effect or side-effect of any medication you should stop taking those medications immediately and seek medical attention. If you feel that you are having a life-threatening reaction call 911.        Do not drive, swim, climb to height, take a bath, operate heavy machinery, drink alcohol or take potentially sedating medications, sign any legal documents or make any important decisions for 24 hours if you have received any pain medications, sedatives or mood altering drugs during your ER visit or within 24 hours of taking them if they have been prescribed to you.     You can find additional resources for Dentists, hearing aids, durable medical equipment, low cost pharmacies and  other resources at [email or web address]     Vital Signs Last 24 Hrs  T(C): 37.2 (05 Oct 2022 22:44), Max: 37.3 (05 Oct 2022 15:12)  T(F): 98.9 (05 Oct 2022 22:44), Max: 99.1 (05 Oct 2022 15:12)  HR: 83 (05 Oct 2022 22:44) (81 - 84)  BP: 123/83 (05 Oct 2022 22:44) (107/79 - 123/83)  BP(mean): 88 (05 Oct 2022 15:12) (88 - 88)  RR: 18 (05 Oct 2022 22:44) (18 - 18)  SpO2: 99% (05 Oct 2022 22:44) (99% - 100%)    Parameters below as of 05 Oct 2022 22:44  Patient On (Oxygen Delivery Method): room air